# Patient Record
(demographics unavailable — no encounter records)

---

## 2025-07-20 NOTE — ASSESSMENT
[FreeTextEntry1] : Mr. NGHIA PALACIO is a 39 year old man with PMHx of HCM (reverse sepal curvature with BSH 20 mm, LVOTo 28mmHg with Valsalva), ANTONETTE on CPAP who is presenting to establish care in HCM clinic.  -Had extensive discussion with patient about HCM, including diagnosis, management, and prognosis -He appears to be mostly asymptomatic. Chest pain and LVOT gradient 28mmHg while admitted may have been in the setting of dehydration.  -Order exercise stress echo (just LVOT gradient) to evaluate obstruction -No strong indication for beta blocker at this point.  -Order cardiac MRI for definitive LV wall measurements and LGE assessment -Refer to MARIXA Randall for SCD risk assessment  -Sudden cardiac death screening:       -FHx of SCD: no       -Massive LVH: no       -Unexplained syncope: no       -Apical aneurysm: no       -LVEF <50%: no       -NSVT on MCOT: pending       -Extensive LGE on cMRI: pending -Recommend genetic testing and family screening. He will think about it.   Time in encounter, including face to face visit and time reviewing chart: 55  minutes  Dave Galan MD, FACC Non-Invasive Cardiology [address] Office: [phone]

## 2025-07-20 NOTE — REASON FOR VISIT
[Other: ____] : [unfilled] [Source: ______] : History obtained from [unfilled] [Language Line ] : provided by Language Line   [Time Spent: ____ minutes] : Total time spent using  services: [unfilled] minutes. The patient's primary language is not English thus required  services. [Interpreters_IDNumber] : [id number] [TWNoteComboBox1] : Leo [FreeTextEntry1] : Mr. NGHIA PALACIO is a 39 year old man with PMHx of HCM (reverse sepal curvature with BSH 20 mm, LVOTo 28mmHg with Valsalva), ANTONETTE on CPAP who is presenting to establish care in HCM clinic.  In July 2025, he presented to [facility] with diarrhea and fevers. Troponin and ECG were abnormal, so he underwent TTE. TTE demonstrated septal hypertrophy ~20mm with LVOT obstruction 28mmHg, consistent with HCM.  He has no lightheadedness SOB or palpitations. He has no limitations with exertion. During his admission for diarrhea, he had some abdominal and chest pain. These symptoms have resolved since being home and feeling better.  Two years ago- he was talking to a friend and started to feel dizzy. He then had an episode of syncope for "few seconds".    He has no FHx of HCM or SCD.  He has 1 younger brother and 2 children.   TTE 7/6/2025 CONCLUSIONS:  1. Left ventricular systolic function is normal with an ejection fraction visually estimated at 65 to 70 %.  2. Asymmetrical septal hypertrophy and systolic anterior motion of the mitral valve consistent with hypertrophic cardiomyopathy.  3. Severe septal hypertrophy without evidence of left ventricular outflow tract obstruction with a gradient of 28 mmHg with Valsalva.  4. Moderate (grade 2) left ventricular diastolic dysfunction.  5. Left atrium is moderately dilated.  6. Mild mitral regurgitation. The mitral regurgitant jet is posteriorly directed.  7. Mild tricuspid regurgitation.

## 2025-07-20 NOTE — REASON FOR VISIT
[Other: ____] : [unfilled] [Source: ______] : History obtained from [unfilled] [Language Line ] : provided by Language [employer]   [Time Spent: ____ minutes] : Total time spent using  services: [unfilled] minutes. The patient's primary language is not English thus required  services. [Interpreters_IDNumber] : [id number] [TWNoteComboBox1] : Leo [FreeTextEntry1] : Mr. NGHIA PALACIO is a 39 year old man with PMHx of HCM (reverse sepal curvature with BSH 20 mm, LVOTo 28mmHg with Valsalva), ANTONETTE on CPAP who is presenting to establish care in HCM clinic.  In July 2025, he presented to [facility] with diarrhea and fevers. Troponin and ECG were abnormal, so he underwent TTE. TTE demonstrated septal hypertrophy ~20mm with LVOT obstruction 28mmHg, consistent with HCM.  He has no lightheadedness SOB or palpitations. He has no limitations with exertion. During his admission for diarrhea, he had some abdominal and chest pain. These symptoms have resolved since being home and feeling better.  Two years ago- he was talking to a friend and started to feel dizzy. He then had an episode of syncope for "few seconds".    He has no FHx of HCM or SCD.  He has 1 younger brother and 2 children.   TTE 7/6/2025 CONCLUSIONS:  1. Left ventricular systolic function is normal with an ejection fraction visually estimated at 65 to 70 %.  2. Asymmetrical septal hypertrophy and systolic anterior motion of the mitral valve consistent with hypertrophic cardiomyopathy.  3. Severe septal hypertrophy without evidence of left ventricular outflow tract obstruction with a gradient of 28 mmHg with Valsalva.  4. Moderate (grade 2) left ventricular diastolic dysfunction.  5. Left atrium is moderately dilated.  6. Mild mitral regurgitation. The mitral regurgitant jet is posteriorly directed.  7. Mild tricuspid regurgitation.

## 2025-07-20 NOTE — ASSESSMENT
[FreeTextEntry1] : Mr. NGHIA PALACIO is a 39 year old man with PMHx of HCM (reverse sepal curvature with BSH 20 mm, LVOTo 28mmHg with Valsalva), ANOTNETTE on CPAP who is presenting to establish care in HCM clinic.  -Had extensive discussion with patient about HCM, including diagnosis, management, and prognosis -He appears to be mostly asymptomatic. Chest pain and LVOT gradient 28mmHg while admitted may have been in the setting of dehydration.  -Order exercise stress echo (just LVOT gradient) to evaluate obstruction -No strong indication for beta blocker at this point.  -Order cardiac MRI for definitive LV wall measurements and LGE assessment -Refer to MARIXA Randall for SCD risk assessment  -Sudden cardiac death screening:       -FHx of SCD: no       -Massive LVH: no       -Unexplained syncope: no       -Apical aneurysm: no       -LVEF <50%: no       -NSVT on MCOT: pending       -Extensive LGE on cMRI: pending -Recommend genetic testing and family screening. He will think about it.   Time in encounter, including face to face visit and time reviewing chart: 55  minutes  Dave Galan MD, FACC Non-Invasive Cardiology [address] Office: [phone]

## 2025-07-23 NOTE — REASON FOR VISIT
[Other: ____] : [unfilled] [Language Line ] : provided by Language Line   [Time Spent: ____ minutes] : Total time spent using  services: [unfilled] minutes. The patient's primary language is not English thus required  services. [FreeTextEntry3] : Dr. Galan [Interpreters_IDNumber] : [id number] [Interpreters_FullName] : Alaina [TWNoteComboBox1] : Leo

## 2025-07-23 NOTE — CARDIOLOGY SUMMARY
[de-identified] : 7/23/2025 NSR (HR 70 bpm), RSR [de-identified] : TTE 7/6/2025 1. Left ventricular systolic function is normal with an ejection fraction visually estimated at 65 to 70 %. 2. Asymmetrical septal hypertrophy and systolic anterior motion of the mitral valve consistent with hypertrophic cardiomyopathy. 3. Severe septal hypertrophy without evidence of left ventricular outflow tract obstruction with a gradient of 28 mmHg with Valsalva. 4. Moderate (grade 2) left ventricular diastolic dysfunction. 5. Left atrium is moderately dilated. 6. Mild mitral regurgitation. The mitral regurgitant jet is posteriorly directed. 7. Mild tricuspid regurgitation.

## 2025-07-23 NOTE — DISCUSSION/SUMMARY
[EKG obtained to assist in diagnosis and management of assessed problem(s)] : EKG obtained to assist in diagnosis and management of assessed problem(s) [FreeTextEntry1] : I have recommended an event monitor to further evaluate his symptoms to determine if the symptoms may be related to a cardiac arrhythmia.  I educated the patient about the patient symptom activator feature and I have asked him to activate the event monitor whenever he has symptoms.

## 2025-07-23 NOTE — ASSESSMENT
[FreeTextEntry1] : # Hypertrophic Cardiomyopathy - Pending cardiac MRI, scheduled - Rec MCOT for risk stratification of sudden cardiac death. No other high risk features. Never had syncope.  - Genetic testing per Dr. Galan.   # ANTONETTE  - Not compliant with CPAP for the last 6 mo. Thinks his condition improved. - Cardio prevention clinic referral for ANTONETTE eval    I have also advised the patient to go to the nearest emergency room if he experiences any chest pain, dyspnea, syncope, or has any other compelling symptoms.   Follow up in 6 weeks

## 2025-07-23 NOTE — HISTORY OF PRESENT ILLNESS
[FreeTextEntry1] :  Cardio: Dr. 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 In July 2025, he presented to [facility] with diarrhea and fevers. Troponin and ECG were abnormal, so he underwent TTE, which demonstrated septal hypertrophy ~20 mmHg and LVOT obstruction 28 mmHg, consistent with HCM.  He has no lightheadedness SOB or palpitations. He has no limitations with exertion. During his admission for diarrhea, he had some abdominal and chest pain. These symptoms have resolved since being home and feeling better. He now denies chest pain, palpitations, presyncope or syncope. Had one episode of dizziness 2 years ago while at a conference but did not pass out. Episode lasted a few seconds. No family history of sudden cardiac death. He has 1 younger brother and 2 children.

## 2025-07-23 NOTE — PHYSICAL EXAM
[Well Developed] : well developed [Well Nourished] : well nourished [No Acute Distress] : no acute distress [Normal S1, S2] : normal S1, S2 [No Murmur] : no murmur [Clear Lung Fields] : clear lung fields [Good Air Entry] : good air entry [No Respiratory Distress] : no respiratory distress  [Soft] : abdomen soft [Normal Gait] : normal gait [No Edema] : no edema [No Rash] : no rash [Moves all extremities] : moves all extremities [Normal Speech] : normal speech [Alert and Oriented] : alert and oriented